# Patient Record
(demographics unavailable — no encounter records)

---

## 2024-12-08 NOTE — HISTORY OF PRESENT ILLNESS
[FreeTextEntry1] : 	-	Summary: Patient presents today with complaints of pelvic pain, fear of certain medical procedures due to past experiences, and anxiety/depression resulting from personal stresses. 	-	Chief Complaint (CC): The main health concerns reported by the patient are pelvic pain, severe anxiety, depression, and weight gain. 	-	History of Present Illness: The patient is a 55-year-old female, who has been experiencing persistent pelvic pain located between her belly button and her left hip, which she compares to the discomfort she felt post . She recently restarted Ozempic, a medication for weight control, having stopped due to the same pain or similar a few months prior. Despite her apprehensions, Ozempic has helped her lose 11 pounds. Patient also reports high levels of stress due to personal circumstances chiefly involving the responsibility of caring for her parents. She expresses extreme anxiety and depression, for which she had been seeing a psychiatrist. However, she opted to stop psychiatric treatment as she was uncomfortable with the medication that was prescribed. She has not returned to psychotherapy yet, but is considering finding a new therapist. Pt going for mammo. and pain is improved.  Ct scan negative. 	-	Past Medical History: The patient had two C-sections, a significant amount of adhesion formation was reported in the second operation. She has a history of IUD perforating her uterine wall, which was removed vaginally. Additionally, there are two instances of breast implant surgery due to rupture. She mentions menopause onset at 45 years of age, and also says that she has no other chronic diseases such as hypertension, diabetes, or asthma. 	-	Past Surgical History: From her surgical history, it's notable the patient has had two complex  operations at ages 21 and 23, with challenging adhesion formations in the second procedure. She has also had two breast implant surgeries, the second one due to a rupture. The patient also had a surgical procedure to remove her IUD. 	-	Family History: She reports her mother has gastro issues and one of her siblings has been recently hospitalized for an unidentified issue which they suspect may have a link to a recent COVID-19 vaccination. No family history of cancer. 	-	Social History: 	-	Occupation and Employment: Not mentioned 	-	Education: Not mentioned 	-	Living Situation: Not mentioned 	-	Marital Status: Mentioned that she is  	-	Lifestyle and Habits: Not mentioned 	-	Substance Use: No reported substance use 	-	Sexual History: Not mentioned 	-	Travel History: Not mentioned 	-	Cultural, Sikh, or Spiritual Beliefs: Not mentioned 	-	Social Support and Community Involvement: Reports that she is responsible for caring for her parents 	-	Hobbies and Recreational Activities: Not mentioned 	-	Review of Systems: 	-	General: Patient complains about weight gain 	-	Neurological: Patient reports of severe anxiety and depression, and does not want to take medication 	-	Musculoskeletal: Patients has pain in her abdomen 	-	Cardiovascular: Not applicable 	-	Respiratory: Not applicable 	-	Gastrointestinal: Patient has severe pelvic pain and history of IUD through the wall of her uterus 	-	Genitourinary: Patient is experiencing pressure on her bladder 	-	Integumentary: Not applicable 	-	Psychiatric: Patient complains of high stress, anxiety, and depression 	-	Medications: The patient is intermittently taking Ozempic for weight loss 	-	Allergies: The patient reports allergies to Penicillin and Sulfur medications

## 2024-12-08 NOTE — PLAN
[FreeTextEntry1] : Pt with recent episode of pelvic pain neg imaging discussed possible hrt use. PE normal pt also to see GI for colonoscopy, and psychiatry for tx of anxiety/depression. To consider estrace for dyspareunia. exam normal and to call if recurrent pain. During this visit 30 minutes were spent face-to-face with greater than 50% of this time dedicated to counseling.

## 2024-12-08 NOTE — PHYSICAL EXAM
[Chaperone Present] : A chaperone was present in the examining room during all aspects of the physical examination [FreeTextEntry2] : tono [Appropriately responsive] : appropriately responsive [Alert] : alert [No Acute Distress] : no acute distress [Soft] : soft [Non-tender] : non-tender [Non-distended] : non-distended [No Lesions] : no lesions [No Mass] : no mass [Examination Of The Breasts] : a normal appearance [No Masses] : no breast masses were palpable [Labia Majora] : normal [Labia Minora] : normal [Uterine Adnexae] : normal [Normal rectal exam] : was normal [Normal Brown Stool] : was normal and brown [Internal Hemorrhoid] : no internal hemorrhoids were present [External Hemorrhoid] : no external hemorrhoids were present [Skin Tags] : no residual hemorrhoidal skin tags [Normal] : was normal [None] : there was no rectal mass